# Patient Record
Sex: MALE | Race: BLACK OR AFRICAN AMERICAN | NOT HISPANIC OR LATINO | URBAN - METROPOLITAN AREA
[De-identification: names, ages, dates, MRNs, and addresses within clinical notes are randomized per-mention and may not be internally consistent; named-entity substitution may affect disease eponyms.]

---

## 2020-08-13 ENCOUNTER — OFFICE VISIT (OUTPATIENT)
Dept: URBAN - METROPOLITAN AREA CLINIC 46 | Facility: CLINIC | Age: 20
End: 2020-08-13

## 2020-08-13 PROBLEM — 3951002 PROCTITIS: Status: ACTIVE | Noted: 2020-08-13

## 2020-08-13 PROBLEM — 10743008 IRRITABLE BOWEL SYNDROME: Status: ACTIVE | Noted: 2020-08-13

## 2020-08-26 ENCOUNTER — OFFICE VISIT (OUTPATIENT)
Dept: URBAN - METROPOLITAN AREA SURGERY CENTER 27 | Facility: SURGERY CENTER | Age: 20
End: 2020-08-26

## 2020-08-26 LAB — PDFREPORT1: (no result)

## 2020-09-01 ENCOUNTER — LAB OUTSIDE AN ENCOUNTER (OUTPATIENT)
Dept: URBAN - METROPOLITAN AREA CLINIC 13 | Facility: CLINIC | Age: 20
End: 2020-09-01

## 2020-09-08 ENCOUNTER — OFFICE VISIT (OUTPATIENT)
Dept: URBAN - METROPOLITAN AREA CLINIC 13 | Facility: CLINIC | Age: 20
End: 2020-09-08

## 2020-09-14 ENCOUNTER — OFFICE VISIT (OUTPATIENT)
Dept: URBAN - METROPOLITAN AREA CLINIC 13 | Facility: CLINIC | Age: 20
End: 2020-09-14

## 2020-09-15 ENCOUNTER — OFFICE VISIT (OUTPATIENT)
Dept: URBAN - METROPOLITAN AREA CLINIC 46 | Facility: CLINIC | Age: 20
End: 2020-09-15

## 2021-01-12 ENCOUNTER — OFFICE VISIT (OUTPATIENT)
Dept: URBAN - METROPOLITAN AREA CLINIC 44 | Facility: CLINIC | Age: 21
End: 2021-01-12

## 2021-08-28 ENCOUNTER — TELEPHONE ENCOUNTER (OUTPATIENT)
Dept: URBAN - METROPOLITAN AREA CLINIC 13 | Facility: CLINIC | Age: 21
End: 2021-08-28

## 2021-08-28 RX ORDER — PREDNISONE 10 MG/1
TABLET ORAL
OUTPATIENT
Start: 2020-08-26 | End: 2021-01-12

## 2021-08-29 ENCOUNTER — TELEPHONE ENCOUNTER (OUTPATIENT)
Dept: URBAN - METROPOLITAN AREA CLINIC 13 | Facility: CLINIC | Age: 21
End: 2021-08-29

## 2021-08-29 RX ORDER — PREDNISONE 10 MG/1
TABLET ORAL
Status: ACTIVE | COMMUNITY
Start: 2021-01-12

## 2021-08-29 RX ORDER — CHROMIUM 200 MCG
TABLET ORAL
Status: ACTIVE | COMMUNITY

## 2021-09-30 ENCOUNTER — OFFICE VISIT (OUTPATIENT)
Dept: URBAN - METROPOLITAN AREA CLINIC 48 | Facility: CLINIC | Age: 21
End: 2021-09-30
Payer: SELF-PAY

## 2021-09-30 VITALS
WEIGHT: 129.6 LBS | SYSTOLIC BLOOD PRESSURE: 106 MMHG | HEIGHT: 65 IN | DIASTOLIC BLOOD PRESSURE: 66 MMHG | BODY MASS INDEX: 21.59 KG/M2 | HEART RATE: 102 BPM | TEMPERATURE: 98.3 F

## 2021-09-30 DIAGNOSIS — K52.89 (LYMPHOCYTIC) MICROSCOPIC COLITIS: ICD-10-CM

## 2021-09-30 DIAGNOSIS — R63.4 UNINTENTIONAL WEIGHT LOSS: ICD-10-CM

## 2021-09-30 DIAGNOSIS — R10.84 GENERALIZED ABDOMINAL PAIN: ICD-10-CM

## 2021-09-30 DIAGNOSIS — R11.14 BILIOUS VOMITING WITH NAUSEA: ICD-10-CM

## 2021-09-30 DIAGNOSIS — K92.1 BLOODY STOOL: ICD-10-CM

## 2021-09-30 PROCEDURE — 99214 OFFICE O/P EST MOD 30 MIN: CPT | Performed by: INTERNAL MEDICINE

## 2021-09-30 RX ORDER — PREDNISONE 10 MG/1
AS DIRECTED TABLET ORAL ONCE A DAY
Qty: 140 TABLET | Refills: 1
Start: 2021-01-12 | End: 2022-01-19

## 2021-09-30 RX ORDER — METRONIDAZOLE 500 MG/1
1 TABLET TABLET ORAL THREE TIMES A DAY
Qty: 30 | Refills: 0 | OUTPATIENT
Start: 2021-09-30 | End: 2021-10-10

## 2021-09-30 RX ORDER — PREDNISONE 10 MG/1
TABLET ORAL
Status: ACTIVE | COMMUNITY
Start: 2021-01-12

## 2021-09-30 RX ORDER — CHROMIUM 200 MCG
TABLET ORAL
Status: ON HOLD | COMMUNITY

## 2021-09-30 NOTE — HPI-TODAY'S VISIT:
The patient presents for a follow up on inflammatory bowel disease. He was seen 9/2019 for symptoms of daily hematochezia with urgency, weight loss and abdominal pain which prompted an ER visit. Work up at that time included a CT scan that revealed protocolitis and fluid distension of the small bowel. He was given cipro and flagyl and discharged. He also complained of continued unexplained weight loss and rectal bleeding. He sought a colonoscopy with a local GI doctor but was told he could not have the colonoscopy unless he paid a large sum up front; which he did not have. He then continued to treat his symptoms with changes in diet but they progressively worsened.   We performed an EGD/Colonoscopy on 8/26/20 which revealed pan colitis and biopsies consistent with chronic severe colitis. There was evidenced of a stenosed IC valve and the terminal ileum could not be intubated. Contact bleeding was also present.   Prednisone was initiated after the procedure with resolution of hematochezia, improved stool consistency, good appetite. Baseline weight reported at 120 lbs.   [facility] ordered to confirm a diagnosis of Crohn's and started work up for Remicade. However, due to lack of insurance he did not complete the labs or start Infusions. Instead he mentioned starting CBD oil which gave relief with hematochezia and urgency.   He was last seen in January and given Prednisone 10 mg Take 3 tablets every morning for 2 wks.Take 2 tablets morning for 2 wks.Take 1 tablet morning for 2 wks.Take.5 tablet morning for 2 wks. CRP and ESR ordered and do not appear to have been done.   He has not been seen since and returns today and states he has been taking Prednisone on and off since he was last seen with the refills he was given; more recently taking 10 mg a day until he ran out. While he is on Prednisone, he feels pretty good, but when he tries to come off of it, symptoms return. Since last week, he has been having 5-7 bloody stools per day which are loose to soft, episodes of night sweats and nocturnal bowel movements, sharp abdominal pains with some relief with a bowel movement, weight loss of 15 pounds over the last few weeks, intermittent episodes of vomiting, and general fatigue. He still has no insurance and was just advised by a friend to look into [employer]. His mother is with him, and states it is unlikely they will even be able to get labs checked due to cost.

## 2021-11-29 ENCOUNTER — OFFICE VISIT (OUTPATIENT)
Dept: URBAN - METROPOLITAN AREA CLINIC 44 | Facility: CLINIC | Age: 21
End: 2021-11-29

## 2022-04-08 ENCOUNTER — TELEPHONE ENCOUNTER (OUTPATIENT)
Dept: URBAN - METROPOLITAN AREA SURGERY CENTER 30 | Facility: SURGERY CENTER | Age: 22
End: 2022-04-08

## 2022-04-08 ENCOUNTER — TELEPHONE ENCOUNTER (OUTPATIENT)
Dept: URBAN - METROPOLITAN AREA CLINIC 46 | Facility: CLINIC | Age: 22
End: 2022-04-08

## 2022-04-11 ENCOUNTER — TELEPHONE ENCOUNTER (OUTPATIENT)
Dept: URBAN - METROPOLITAN AREA CLINIC 46 | Facility: CLINIC | Age: 22
End: 2022-04-11

## 2022-04-13 ENCOUNTER — DASHBOARD ENCOUNTERS (OUTPATIENT)
Age: 22
End: 2022-04-13

## 2022-04-13 ENCOUNTER — OFFICE VISIT (OUTPATIENT)
Dept: URBAN - METROPOLITAN AREA CLINIC 44 | Facility: CLINIC | Age: 22
End: 2022-04-13
Payer: SELF-PAY

## 2022-04-13 VITALS
HEIGHT: 65 IN | BODY MASS INDEX: 19.93 KG/M2 | SYSTOLIC BLOOD PRESSURE: 117 MMHG | HEART RATE: 90 BPM | WEIGHT: 119.6 LBS | DIASTOLIC BLOOD PRESSURE: 64 MMHG | TEMPERATURE: 97.9 F

## 2022-04-13 DIAGNOSIS — R63.4 UNINTENTIONAL WEIGHT LOSS: ICD-10-CM

## 2022-04-13 DIAGNOSIS — K52.9 INFLAMMATORY BOWEL DISEASE: ICD-10-CM

## 2022-04-13 PROCEDURE — 99214 OFFICE O/P EST MOD 30 MIN: CPT | Performed by: INTERNAL MEDICINE

## 2022-04-13 RX ORDER — CHROMIUM 200 MCG
TABLET ORAL
Status: ON HOLD | COMMUNITY

## 2022-04-13 RX ORDER — MESALAMINE 400 MG/1
1 CAPSULE CAPSULE, DELAYED RELEASE ORAL
Qty: 360 CAPSULE | Refills: 0 | OUTPATIENT
Start: 2022-04-13 | End: 2022-07-12

## 2022-04-13 NOTE — HPI-TODAY'S VISIT:
The patient presents for a follow up on inflammatory bowel disease. He was seen 9/2019 for symptoms of daily hematochezia with urgency, weight loss and abdominal pain which prompted an ER visit. Work up at that time included a CT scan that revealed protocolitis and fluid distension of the small bowel. He was given cipro and flagyl and discharged. He also complained of continued unexplained weight loss and rectal bleeding. He sought a colonoscopy with a local GI doctor but was told he could not have the colonoscopy unless he paid a large sum up front; which he did not have. He then continued to treat his symptoms with changes in diet but they progressively worsened.   We performed an EGD/Colonoscopy on 8/26/20 which revealed pan colitis and biopsies consistent with chronic severe colitis. There was evidenced of a stenosed IC valve and the terminal ileum could not be intubated. Contact bleeding was also present.   Prednisone was initiated after the procedure with resolution of hematochezia, improved stool consistency, good appetite. Baseline weight reported at 120 lbs.   [facility] ordered to confirm a diagnosis of Crohn's and started work up for Remicade. However, due to lack of insurance he did not complete the labs or start Infusions. Instead he mentioned starting CBD oil which gave relief with hematochezia and urgency.   He was seen in January and given Prednisone 10 mg Take 3 tablets every morning for 2 wks.Take 2 tablets morning for 2 wks.Take 1 tablet morning for 2 wks.Take.5 tablet morning for 2 wks. CRP and ESR ordered and do not appear to have been done.   He then did not f/u until 9/2021 and stated he had been taking Prednisone on and off since he was last seen with the refills he was given. While he is on Prednisone, he feels pretty good, but when he tries to come off of it, symptoms return. Recently had been having 5-7 bloody stools per day which are loose to soft, episodes of night sweats and nocturnal bowel movements, sharp abdominal pains with some relief with a bowel movement, weight loss of 15 pounds over the last few weeks, intermittent episodes of vomiting, and general fatigue. He still had no insurance and was just advised by a friend to look into [employer]. His mother is with him, and states it is unlikely they will even be able to get labs checked due to cost. We discussed the imperative nature of obtaining unsurance coverage as well as concerns for risks of chronic Prednisone use, and the need for maintenance medication. He was to follow up with Dr. Phipps in November and did not come. I also ordered labs including CBC, CMP, CRP, and iron studies which were not done.   He returns today and has been on Mesalamine 400 mg PO two tabs BID for the last month (given by urgent care) and is now off of Prednisone. He is doing much better and now having formed stools without blood. He has an occasional abdominal pain, but no severe pain. He has lost 10 pounds since his last visit. 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 He may be moving to [place].

## 2022-09-09 ENCOUNTER — TELEPHONE ENCOUNTER (OUTPATIENT)
Dept: URBAN - METROPOLITAN AREA CLINIC 46 | Facility: CLINIC | Age: 22
End: 2022-09-09

## 2022-09-09 RX ORDER — MESALAMINE 400 MG/1
1 CAPSULE CAPSULE, DELAYED RELEASE ORAL
Qty: 360 CAPSULE | Refills: 0